# Patient Record
Sex: FEMALE | Race: BLACK OR AFRICAN AMERICAN | HISPANIC OR LATINO | URBAN - METROPOLITAN AREA
[De-identification: names, ages, dates, MRNs, and addresses within clinical notes are randomized per-mention and may not be internally consistent; named-entity substitution may affect disease eponyms.]

---

## 2020-08-28 ENCOUNTER — NURSE TRIAGE (OUTPATIENT)
Dept: OTHER | Facility: OTHER | Age: 30
End: 2020-08-28

## 2020-08-28 NOTE — TELEPHONE ENCOUNTER
As per Dr Yoan Peña she should take two extra strength Tylenol and if she does not have any pain relief to please call back  Tramadol will not be ordered  Pt is aware and verbalized understanding

## 2020-08-28 NOTE — TELEPHONE ENCOUNTER
Regarding: pain after surgery - Yi speaker   ----- Message from Mireille Cheema sent at 8/28/2020  6:52 PM EDT -----  Per pt had surgery yesterday (gastric bypass) and she is in pain  Pt was requesting refills on her tramadole 50 mg  Explained to pt that I will need to send information to doctors office and they will get it on Monday  Pt stated she is in pain and tylenol is not helping   Pt goes to [facility] on [address]

## 2020-08-28 NOTE — TELEPHONE ENCOUNTER
Reason for Disposition   [1] SEVERE post-op pain (e g , excruciating, pain scale 8-10) AND [2] not controlled with pain medications    Answer Assessment - Initial Assessment Questions  1  SYMPTOM: "What's the main symptom you're concerned about?" (e g , pain, fever, vomiting)      Incision site pain  2  ONSET: "When did pain  start?"      8/25/2020  3  SURGERY: "What surgery was performed?"      Gastric sleeve  4  DATE of SURGERY: "When was surgery performed?"       8/25/2020  5  ANESTHESIA: " What type of anesthesia did you have?" (e g , general, spinal, epidural, local)      General   6  PAIN: "Is there any pain?" If so, ask: "How bad is it?"  (Scale 1-10; or mild, moderate, severe)      # 7-8   Last took tylenol at 4 pm 500 mg  7  FEVER: "Do you have a fever?" If so, ask: "What is your temperature, how was it measured, and when did it start?"      none  8  VOMITING: "Is there any vomiting?" If yes, ask: "How many times?"      none  9  BLEEDING: "Is there any bleeding?" If so, ask: "How much?" and "Where?"      none  10   OTHER SYMPTOMS: "Do you have any other symptoms?" (e g , drainage from wound, painful urination, constipation)        The pain is on the abdomen at the incision site  Tylenol is not working she wants tramadol    Protocols used: POST-OP SYMPTOMS AND QUESTIONS-[facility]